# Patient Record
Sex: FEMALE | Race: WHITE
[De-identification: names, ages, dates, MRNs, and addresses within clinical notes are randomized per-mention and may not be internally consistent; named-entity substitution may affect disease eponyms.]

---
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
## 2024-04-05 ENCOUNTER — EMERGENCY (EMERGENCY)
Facility: HOSPITAL | Age: 19
LOS: 0 days | Discharge: HOME | End: 2024-04-05
Attending: STUDENT IN AN ORGANIZED HEALTH CARE EDUCATION/TRAINING PROGRAM
Payer: COMMERCIAL

## 2024-04-05 VITALS
HEART RATE: 68 BPM | TEMPERATURE: 98 F | SYSTOLIC BLOOD PRESSURE: 108 MMHG | RESPIRATION RATE: 18 BRPM | OXYGEN SATURATION: 100 % | DIASTOLIC BLOOD PRESSURE: 66 MMHG | WEIGHT: 179.9 LBS

## 2024-04-05 VITALS
TEMPERATURE: 98 F | SYSTOLIC BLOOD PRESSURE: 115 MMHG | DIASTOLIC BLOOD PRESSURE: 59 MMHG | HEART RATE: 69 BPM | RESPIRATION RATE: 17 BRPM | OXYGEN SATURATION: 99 %
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
## 2024-04-05 DIAGNOSIS — S70.01XA CONTUSION OF RIGHT HIP, INITIAL ENCOUNTER: ICD-10-CM
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
## 2024-04-05 DIAGNOSIS — M25.562 PAIN IN LEFT KNEE: ICD-10-CM

## 2024-04-05 DIAGNOSIS — S09.90XA UNSPECIFIED INJURY OF HEAD, INITIAL ENCOUNTER: ICD-10-CM
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
## 2024-04-05 DIAGNOSIS — S20.229A CONTUSION OF UNSPECIFIED BACK WALL OF THORAX, INITIAL ENCOUNTER: ICD-10-CM

## 2024-04-05 PROCEDURE — 73562 X-RAY EXAM OF KNEE 3: CPT | Mod: LT

## 2024-04-05 PROCEDURE — 99284 EMERGENCY DEPT VISIT MOD MDM: CPT | Mod: 25

## 2024-04-05 PROCEDURE — 73502 X-RAY EXAM HIP UNI 2-3 VIEWS: CPT | Mod: RT

## 2024-04-05 PROCEDURE — 73502 X-RAY EXAM HIP UNI 2-3 VIEWS: CPT | Mod: 26,RT

## 2024-04-05 PROCEDURE — 72125 CT NECK SPINE W/O DYE: CPT | Mod: 26,MC

## 2024-04-05 PROCEDURE — 99285 EMERGENCY DEPT VISIT HI MDM: CPT

## 2024-04-05 PROCEDURE — 72125 CT NECK SPINE W/O DYE: CPT | Mod: MC

## 2024-04-05 PROCEDURE — 70450 CT HEAD/BRAIN W/O DYE: CPT | Mod: MC

## 2024-04-05 PROCEDURE — 73562 X-RAY EXAM OF KNEE 3: CPT | Mod: 26,LT

## 2024-04-05 PROCEDURE — 70450 CT HEAD/BRAIN W/O DYE: CPT | Mod: 26,MC

## 2024-04-05 RX ORDER — ACETAMINOPHEN 500 MG
650 TABLET ORAL ONCE
Refills: 0 | Status: COMPLETED | OUTPATIENT
Start: 2024-04-05 | End: 2024-04-05

## 2024-04-05 RX ORDER — METOCLOPRAMIDE HCL 10 MG
10 TABLET ORAL ONCE
Refills: 0 | Status: COMPLETED | OUTPATIENT
Start: 2024-04-05 | End: 2024-04-05

## 2024-04-05 RX ORDER — IBUPROFEN 200 MG
400 TABLET ORAL ONCE
Refills: 0 | Status: COMPLETED | OUTPATIENT
Start: 2024-04-05 | End: 2024-04-05

## 2024-04-05 RX ADMIN — Medication 400 MILLIGRAM(S): at 17:38

## 2024-04-05 RX ADMIN — Medication 10 MILLIGRAM(S): at 17:38

## 2024-04-05 RX ADMIN — Medication 650 MILLIGRAM(S): at 17:39

## 2024-04-05 NOTE — ED ADULT NURSE NOTE - OBJECTIVE STATEMENT
Pt presents to the ED s/p having a fridge fall on her causing her to fall on her buttocks and hit her head at work. Pt denies LOC and use of blood thinners. No acute distress noted at this time.

## 2024-04-05 NOTE — ED ADULT TRIAGE NOTE - MODE OF ARRIVAL
Walk in Pt A&Ox4, NAD. Pt presents to the ED with left side facial swelling. Breathing even and unlabored.

## 2024-04-05 NOTE — ED STATDOCS - ADDITIONAL NOTES AND INSTRUCTIONS:
Pt sustained a closed head injury / concussion on 4/4/24.  She will require time for Brain rest.  She should not participate in strenuous activities until Concussion symptoms resolve.

## 2024-04-05 NOTE — ED STATDOCS - CLINICAL SUMMARY MEDICAL DECISION MAKING FREE TEXT BOX
[facility] Attending    Pt with no hx of anticoagulation p/w  headache, neck pain hip pain and knee pain  s/p fall to ground. ( no) LOC. Normal appearing without any signs or symptoms of serious injury on secondary trauma survey. Low suspicion for intracranial hemorrhage or other intracranial traumatic injury. No periorbital or posterior periauricular ecchymosis to suggest skull fracture. No abdominal ecchymosis to indicate concern for serious trauma to the thorax or abdomen. Pelvis without evidence of injury and patient is neurologically intact. Stable gait and tolerating PO. Otherwise no other evidence of deformity or joint instability.  - CT HEAD and NECK, Xr R hip and L knee  - Pain control PRN, Anti-emetics PRN  - Re-eval for disposition.

## 2024-04-05 NOTE — ED STATDOCS - WET READ LAUNCH FT
There are no Wet Read(s) to document. Include The Following Details In The Note (If No Details Will Only Be Reflected In The Surgical Fax): Yes

## 2024-04-05 NOTE — ED STATDOCS - OBJECTIVE STATEMENT
18y F s/p head injury x 2 days ago  pt was working at [employer] when a fridge fell onto her. pt fell to floor, had trauma to the back of head, back, R hip and L knee pain.   ambulating without assistance. did not take ay meds pta. feels headache mild to moderate, dizziness intermittently, nauseous.   no cp, sob, vomiting, abd pain, numbness, weakness

## 2024-04-05 NOTE — ED STATDOCS - PROGRESS NOTE DETAILS
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  Obdulia Price PA-C

## 2024-04-05 NOTE — ED STATDOCS - PATIENT PORTAL LINK FT
You can access the FollowMyHealth Patient Portal offered by [facility] by registering at the following website: [email or web address]. By joining [employer]’s FollowMyHealth portal, you will also be able to view your health information using other applications (apps) compatible with our system.

## 2024-04-05 NOTE — ED STATDOCS - PHYSICAL EXAMINATION
Constitutional: Well developed, well nourished. NAD  TRAUMA: ABC intact. GCS 15. FAST negative.  Head: Normocephalic, atraumatic.  Eyes: PERRL. EOMI. No Raccoon eyes.   ENT: No nasal discharge. No septal hematoma. No Bernardo sign. Mucous membranes moist.  Neck: Supple. Painless ROM. +C spine midline ttp  Cardiovascular: Normal S1, S2. Regular rate and rhythm. No murmurs, rubs, or gallops.  Pulmonary: Normal respiratory rate and effort. Lungs clear to auscultation bilaterally. No wheezing, rales, or rhonchi.  CHEST: No chest wall tenderness, crepitus.  Abdominal: Soft. Nondistended. Nontender. No rebound, guarding, rigidity.  BACK: No midline T/L/S tenderness, stepoffs. No saddle paresthesia.  Extremities. Pelvis stable.   Tenderness over R hip and L knee. FROM. no joint deformity.   Skin: No rashes, cyanosis, lacerations, abrasions.  Neuro: AAOx3. Strength 5/5 in all extremities. Sensation intact throughout. No focal neurological deficits.  Psych: Normal mood. Normal affect.

## 2024-04-05 NOTE — ED ADULT TRIAGE NOTE - CHIEF COMPLAINT QUOTE
pt presenting s/p work injury where full sized fridge fell onto pt as she was standing next to it, she caught it and it pushed her backwards, falling onto her bottom and hit head. no blood thinners

## 2024-04-05 NOTE — ED STATDOCS - CARE PLAN
Principal Discharge DX:	Closed head injury  Secondary Diagnosis:	Contusion of back  Secondary Diagnosis:	Contusion of right hip  Secondary Diagnosis:	Left knee pain  Secondary Diagnosis:	Fall   1

## 2024-04-05 NOTE — ED STATDOCS - NSFOLLOWUPINSTRUCTIONS_ED_ALL_ED_FT
Concussion, Adult  Three rear views of the head showing how quick, sudden head movements injure the brain.  A concussion is a brain injury from a hard, direct hit (trauma) to your head or body. This hit causes your brain to quickly shake back and forth inside your skull. A concussion may also be called a mild traumatic brain injury (TBI). Healing from this injury can take time.    The effects of a concussion can be serious. If you have a concussion, you should be very careful to avoid having a second concussion.    What are the causes?  This condition is caused by:  A direct hit to your head.  A quick and sudden movement of the head or neck, such as in a car crash.  What are the signs or symptoms?  The signs of a concussion can be hard to notice. They may be missed by you, family members, and doctors. You may look fine on the outside but may not act or feel normal.    Physical symptoms    Headaches or feeling dizzy.  Problems with body balance.  Being sensitive to light or noise.  Vomiting or feeling like you may vomit.  Being tired.  Problems seeing or hearing.  Seizure.  Mental and emotional symptoms    Feeling grouchy (irritable) or having mood changes.  Problems remembering things.  Trouble focusing your mind (concentrating), organizing, or making decisions.  Not sleeping or eating as you used to.  Being slow to think, act, react, speak, or read.  Feeling worried or nervous (anxious).  Feeling sad (depressed).  How is this treated?  This condition may be treated by:  Stopping sports or activity if you are injured.  Resting your body and your mind.  Being watched carefully, often at home.  Medicines to help with symptoms such as:  Headaches.  Feeling like you may vomit.  Problems with sleep.  You may need to go to a concussion clinic or a place to help you recover (rehab).    Follow these instructions at home:  Activity    Limit activities that need a lot of thought or focus, such as:  Homework or work for your job.  Watching TV.  Using the computer or phone.  Playing memory games and puzzles.  Get rest because this helps your brain heal. Make sure you:  Get plenty of sleep. Most adults should get 7–9 hours of sleep each night.  Rest during the day. Take naps or breaks when you feel tired.  Avoid activity or exercise that takes a lot of effort until your doctor says it is safe.  Stop any activity that makes symptoms worse.  Your doctor may tell you to do light exercise like walking.  Do not do activities that could cause a second concussion, such as riding a bike or playing sports.  Ask your doctor when you can return to your normal activities, such as school, work, sports, and driving.  Your ability to react may be slower.  Do not do these activities if you are dizzy.  General instructions    A bottle of beer, a glass of wine, and a glass of hard liquor with a "do not drink" sign over them.  Take over-the-counter and prescription medicines only as told by your doctor.  Avoid taking strong pain medicines (opioids) after a concussion.  Do not drink alcohol until your doctor says you can.  Watch your symptoms and tell other people to do the same. Other problems can occur after a concussion.  Tell your , teachers, school nurse, school counselor, , or  about your injury and symptoms. Tell them about what you can or cannot do.  See a mental health therapist if you keep feeling worried and nervous or sad.  Keep all follow-up visits. Your doctor will check on your recovery and give you a plan for returning to activities.  How is this prevented?  It is very important that you do not get another brain injury. In rare cases, another injury can cause brain damage that will not go away, brain swelling, or death. The risk of this is greatest in the first 7–10 days after a head injury. To avoid injuries:  Stop activities that could lead to a second concussion, such as contact sports, until your doctor says it is okay.  When you return to sports or activities:  Do not crash into other players. This is how most concussions happen.  Follow the rules.  Respect other players. Do not engage in violent behavior while playing.  Get regular exercise. Do strength and balance training.  Wear a helmet that fits you well during sports, biking, or other activities.  Helmets can help protect you from serious skull and brain injuries, but they may not protect you from a concussion. Even when wearing a helmet, you should avoid being hit in the head.  Where to find more information  Centers for Disease Control and Prevention: cdc.gov  Contact a doctor if:  Your symptoms do not get better or get worse.  You have new symptoms.  You have another injury.  Your balance gets worse.  You have changes in how you act.  Get help right away if:  You have very bad headaches or your headaches get worse.  You have any of these problems:  Feeling weak or numb in any part of your body.  Slurred speech.  Changes in how you see (vision).  Feeling mixed up (confused).  You vomit often.  You faint or other people have trouble waking you up.  You have a seizure.  These symptoms may be an emergency. Get help right away. Call 911.  Do not wait to see if the symptoms will go away.  Do not drive yourself to the hospital.  Also, get help right away if:  You have thoughts of hurting yourself or others.  Take one of these steps if you feel like you may hurt yourself or others, or have thoughts about taking your own life:  Go to your nearest emergency room.  Call 911.  Call the National Suicide Prevention Lifeline at 1-788.498.7379 or 531. This is open 24 hours a day.  Text the Crisis Text Line at 871775.  This information is not intended to replace advice given to you by your health care provider. Make sure you discuss any questions you have with your health care provider.    Document Revised: 05/12/2023 Document Reviewed: 05/12/2023  [employer] Patient Education © 2024 [employer]  [employer] logo  Terms and Conditions  Privacy Policy  Editorial Policy  All content on this site: Copyright © 2024 Elsevier, its licensors, and contributors. All rights are reserved, including those for text and data mining, AI training, and similar technologies. For all open access content, the Creative Commons licensing terms apply.  Cookies are used by this site. To decline or learn more, visit our Cookies page.

## 2024-04-06 RX ORDER — ONDANSETRON 8 MG/1
1 TABLET, FILM COATED ORAL
Qty: 12 | Refills: 0
Start: 2024-04-06 | End: 2024-04-09

## 2024-09-11 ENCOUNTER — NON-APPOINTMENT (OUTPATIENT)
Age: 19
End: 2024-09-11

## 2024-09-12 ENCOUNTER — EMERGENCY (EMERGENCY)
Facility: HOSPITAL | Age: 19
LOS: 0 days | Discharge: HOME | End: 2024-09-12
Attending: EMERGENCY MEDICINE
Payer: COMMERCIAL

## 2024-09-12 VITALS
RESPIRATION RATE: 16 BRPM | TEMPERATURE: 98 F | SYSTOLIC BLOOD PRESSURE: 117 MMHG | DIASTOLIC BLOOD PRESSURE: 60 MMHG | HEART RATE: 75 BPM | OXYGEN SATURATION: 100 %

## 2024-09-12 VITALS
DIASTOLIC BLOOD PRESSURE: 63 MMHG | SYSTOLIC BLOOD PRESSURE: 126 MMHG | HEIGHT: 69 IN | OXYGEN SATURATION: 100 % | HEART RATE: 81 BPM | RESPIRATION RATE: 20 BRPM | TEMPERATURE: 98 F

## 2024-09-12 DIAGNOSIS — J36 PERITONSILLAR ABSCESS: ICD-10-CM

## 2024-09-12 DIAGNOSIS — K13.79 OTHER LESIONS OF ORAL MUCOSA: ICD-10-CM

## 2024-09-12 LAB — S PYO AG SPEC QL IA: NEGATIVE — SIGNIFICANT CHANGE UP

## 2024-09-12 PROCEDURE — 87880 STREP A ASSAY W/OPTIC: CPT

## 2024-09-12 PROCEDURE — 87081 CULTURE SCREEN ONLY: CPT

## 2024-09-12 PROCEDURE — 99283 EMERGENCY DEPT VISIT LOW MDM: CPT

## 2024-09-12 PROCEDURE — 99284 EMERGENCY DEPT VISIT MOD MDM: CPT

## 2024-09-12 RX ORDER — DEXAMETHASONE 0.75 MG
6 TABLET ORAL ONCE
Refills: 0 | Status: DISCONTINUED | OUTPATIENT
Start: 2024-09-12 | End: 2024-09-12

## 2024-09-12 RX ORDER — AMOXICILLIN AND CLAVULANATE POTASSIUM 250; 125 MG/1; MG/1
1 TABLET, FILM COATED ORAL
Qty: 14 | Refills: 0
Start: 2024-09-12 | End: 2024-09-18

## 2024-09-12 RX ORDER — IBUPROFEN 600 MG
600 TABLET ORAL ONCE
Refills: 0 | Status: COMPLETED | OUTPATIENT
Start: 2024-09-12 | End: 2024-09-12

## 2024-09-12 RX ORDER — KETOROLAC TROMETHAMINE 30 MG/ML
15 INJECTION, SOLUTION INTRAMUSCULAR ONCE
Refills: 0 | Status: DISCONTINUED | OUTPATIENT
Start: 2024-09-12 | End: 2024-09-12

## 2024-09-12 RX ORDER — DEXAMETHASONE 0.75 MG
6 TABLET ORAL ONCE
Refills: 0 | Status: COMPLETED | OUTPATIENT
Start: 2024-09-12 | End: 2024-09-12

## 2024-09-12 RX ORDER — AMOXICILLIN AND CLAVULANATE POTASSIUM 250; 125 MG/1; MG/1
1 TABLET, FILM COATED ORAL ONCE
Refills: 0 | Status: COMPLETED | OUTPATIENT
Start: 2024-09-12 | End: 2024-09-12

## 2024-09-12 RX ORDER — SODIUM CHLORIDE 9 MG/ML
1000 INJECTION INTRAMUSCULAR; INTRAVENOUS; SUBCUTANEOUS ONCE
Refills: 0 | Status: DISCONTINUED | OUTPATIENT
Start: 2024-09-12 | End: 2024-09-12

## 2024-09-12 RX ADMIN — AMOXICILLIN AND CLAVULANATE POTASSIUM 1 TABLET(S): 250; 125 TABLET, FILM COATED ORAL at 21:42

## 2024-09-12 RX ADMIN — Medication 6 MILLIGRAM(S): at 21:42

## 2024-09-12 RX ADMIN — Medication 600 MILLIGRAM(S): at 21:41

## 2024-09-12 NOTE — ED PROVIDER NOTE - CLINICAL SUMMARY MEDICAL DECISION MAKING FREE TEXT BOX
Patient with likely tonsillar abscess, no obvious peritonsillar abscess, will give Decadron Toradol will check basic labs strep swab anticipate discharge.  Will give antibiotics.

## 2024-09-12 NOTE — ED PROVIDER NOTE - PHYSICAL EXAMINATION
Gen: Well appearing in NAD  Head: NC/AT  Neck: Supple no meningismus  ENT: Left testicle with exudates and enlarged, touching the uvula, mild fullness at left tonsillar pillar no appreciable abscess.  Resp: No distress  Ext: No deformities  Neuro: A&O appears non focal  Skin: Warm and dry as visualized  Psych: Normal affect and mood

## 2024-09-12 NOTE — ED PROVIDER NOTE - PATIENT PORTAL LINK FT
You can access the FollowMyHealth Patient Portal offered by [facility] by registering at the following website: [email or web address]. By joining [employer]’s FollowMyHealth portal, you will also be able to view your health information using other applications (apps) compatible with our system.

## 2024-09-12 NOTE — ED ADULT NURSE NOTE - NSFALLUNIVINTERV_ED_ALL_ED
Bed/Stretcher in lowest position, wheels locked, appropriate side rails in place/Call bell, personal items and telephone in reach/Instruct patient to call for assistance before getting out of bed/chair/stretcher/Non-slip footwear applied when patient is off stretcher/[place] to call system/Physically safe environment - no spills, clutter or unnecessary equipment/Purposeful proactive rounding/Room/bathroom lighting operational, light cord in reach

## 2024-09-12 NOTE — ED PROVIDER NOTE - PROGRESS NOTE DETAILS
VIVIAN Haney: pt refused labs and iv meds. decradron and motrin ordered PO. will treat pt with augmentin and pt stable for dc. Pt agrees with plan. VIVIAN Haney: I participated in the care of this patient. I agree with the history, physical and plan.

## 2024-09-12 NOTE — ED PROVIDER NOTE - NSFOLLOWUPINSTRUCTIONS_ED_ALL_ED_FT
Take augmentin twice daily as prescribed   Take motrin 600mg every 6 hours as needed for pain   Drink plenty of fluids   Follow up with your pmd within 48 hours- show copies of ER results   Return to ED if any worsening symptoms.

## 2024-09-12 NOTE — ED PROVIDER NOTE - OBJECTIVE STATEMENT
18-year-old female without significant past medical history presents with throat pain.  Symptoms since Tuesday.  Went to urgent care and was instructed to come to ED for possible peritonsillar abscess.  Patient reports some chills.  Taking DayQuil without relief.Patient reports she is swab negative for mono and strep at urgent care.

## 2024-09-12 NOTE — ED ADULT NURSE NOTE - OBJECTIVE STATEMENT
Pt arrived to ED from urgent care with c/o left sided inflamed tonsils with pain and pus. Pt AOx4 denies any other complaints.

## 2024-09-12 NOTE — ED ADULT TRIAGE NOTE - CHIEF COMPLAINT QUOTE
Pt sent from UC for inflamed left sided tonsil with pain and pus. - allergies. Pt A&ox4, ambulatory, no s/s of distress.